# Patient Record
Sex: MALE | Race: WHITE | NOT HISPANIC OR LATINO
[De-identification: names, ages, dates, MRNs, and addresses within clinical notes are randomized per-mention and may not be internally consistent; named-entity substitution may affect disease eponyms.]

---

## 2022-05-31 PROBLEM — Z00.00 ENCOUNTER FOR PREVENTIVE HEALTH EXAMINATION: Status: ACTIVE | Noted: 2022-05-31

## 2022-06-14 ENCOUNTER — APPOINTMENT (OUTPATIENT)
Dept: ORTHOPEDIC SURGERY | Facility: CLINIC | Age: 72
End: 2022-06-14
Payer: OTHER MISCELLANEOUS

## 2022-06-14 PROCEDURE — 99072 ADDL SUPL MATRL&STAF TM PHE: CPT

## 2022-06-14 PROCEDURE — 99213 OFFICE O/P EST LOW 20 MIN: CPT

## 2022-06-14 NOTE — PHYSICAL EXAM
[Left] : left knee [NL (0)] : extension 0 degrees [5___] : quadriceps 5[unfilled]/5 [] : patient ambulates with assistive device [de-identified] : sl quad atrophy [TWNoteComboBox7] : flexion 135 degrees

## 2022-06-14 NOTE — HISTORY OF PRESENT ILLNESS
[Work related] : work related [Dull/Aching] : dull/aching [Localized] : localized [Household chores] : household chores [Leisure] : leisure [Social interactions] : social interactions [Rest] : rest [Sitting] : sitting [Standing] : standing [Walking] : walking [Stairs] : stairs [7] : 7 [4] : 4 [Intermittent] : intermittent [de-identified] : WC 6/29/96 \par 3/1/11: Pt presents for cont. pain in L knee.\par 9/7/11: worsening of pain in L knee; requesting injection in L knee due to increase in pain.\par 12/14/11: f/u L knee pain, notes increasing pain. Pt would like to start visco-supplementation injections again however\par he lives in [place] and hasn't found a doctor to do it yet.\par 3/21/12: f/u L knee, notes increased sensitivity to touch, otherwise doing well.\par 10/24/12 f/u L knee, takes Norco\par 2/20/13: f/u L knee, cont with Celebrex and Norco. Notes pain on L inner thigh. Requesting refill on Tramadol and Norco.\par Pt doesn't wish to do Supartz injections at this time.\par 4/9/14 f/u L knee, considering tka. Requesting injection\par 8/20/14 f/u L knee\par 12/10/14: f/u L knee. stable.\par 6/17/15 f/u L knee, feels weakness\par 10/7/15 f/u L knee tightness on occasion\par 12/16/15: f/u L knee. stable. taking meds.\par 4/6/16 f/u L knee, better w/ Celebrex Had locking episode\par 8/25/16: f/u L knee, Celebrex very helpful, had to stop because of prostate markers and had exacerbation of L knee\par pain.\par 12/15/16 f/u L knee. Just complete Ca treatment/radiation\par 4/18/17 f/u L knee activity related pain. psa .4 Recent tick bite/doxycycline\par 10/19/17 f/u L knee, symptoms aggravated recently, managing with hep and meds, psa .14\par 4/5/18 f/u L knee bicycle occas locking Celebrex/Ultram/Vicodin Retired\par 8/30/18 f/u L knee "Rice crispy" feelings. Celebrex r/n Retired\par 3/28/19: f/u L knee, continued lateral knee pain. Celebrex and hydrocodone prn\par 9/19/19: f/u L knee, continued pain that is variable, pain medial and lateral.\par 3/12/20 f/u L knee walks 5-6 miles/d Pain persists\par 10/29/20 Televisit L knee med request\par 4/6/21 Televisit L knee stable Med r/n\par 9/28/21: f/u L knee, continued cracking/popping of the knee. requesting renewal on medication.\par 6/14/22: f/u L knee; increasing pain, no swelling. Walks about 5 mils/day.  [] : Post Surgical Visit: no [FreeTextEntry1] : L knee [FreeTextEntry3] :  6/29/96

## 2022-06-14 NOTE — WORK
[Cannot return to work because ________] : cannot return to work because [unfilled] [Rx may affect patient's ability to return to work, make patient drowsy, or other issue] : Rx may affect patient's ability to return to work, make patient drowsy, or other issue. [I provided the services listed above] :  I provided the services listed above.

## 2022-07-17 ENCOUNTER — FORM ENCOUNTER (OUTPATIENT)
Age: 72
End: 2022-07-17

## 2023-04-21 ENCOUNTER — APPOINTMENT (OUTPATIENT)
Dept: ORTHOPEDIC SURGERY | Facility: CLINIC | Age: 73
End: 2023-04-21
Payer: OTHER MISCELLANEOUS

## 2023-04-21 VITALS — HEIGHT: 69.5 IN | BODY MASS INDEX: 26.93 KG/M2 | WEIGHT: 186 LBS

## 2023-04-21 PROCEDURE — 99213 OFFICE O/P EST LOW 20 MIN: CPT | Mod: 25

## 2023-04-21 PROCEDURE — 73562 X-RAY EXAM OF KNEE 3: CPT | Mod: LT

## 2023-04-21 PROCEDURE — 20611 DRAIN/INJ JOINT/BURSA W/US: CPT | Mod: LT

## 2023-04-21 PROCEDURE — J3490M: CUSTOM

## 2023-04-21 NOTE — HISTORY OF PRESENT ILLNESS
[Work related] : work related [8] : 8 [4] : 4 [Dull/Aching] : dull/aching [Localized] : localized [Intermittent] : intermittent [Household chores] : household chores [Leisure] : leisure [Social interactions] : social interactions [Rest] : rest [Sitting] : sitting [Standing] : standing [Walking] : walking [Stairs] : stairs [de-identified] : WC 6/29/96 \par 3/1/11: Pt presents for cont. pain in L knee.\par 9/7/11: worsening of pain in L knee; requesting injection in L knee due to increase in pain.\par 12/14/11: f/u L knee pain, notes increasing pain. Pt would like to start visco-supplementation injections again however\par he lives in [place] and hasn't found a doctor to do it yet.\par 3/21/12: f/u L knee, notes increased sensitivity to touch, otherwise doing well.\par 10/24/12 f/u L knee, takes Norco\par 2/20/13: f/u L knee, cont with Celebrex and Norco. Notes pain on L inner thigh. Requesting refill on Tramadol and Norco.\par Pt doesn't wish to do Supartz injections at this time.\par 4/9/14 f/u L knee, considering tka. Requesting injection\par 8/20/14 f/u L knee\par 12/10/14: f/u L knee. stable.\par 6/17/15 f/u L knee, feels weakness\par 10/7/15 f/u L knee tightness on occasion\par 12/16/15: f/u L knee. stable. taking meds.\par 4/6/16 f/u L knee, better w/ Celebrex Had locking episode\par 8/25/16: f/u L knee, Celebrex very helpful, had to stop because of prostate markers and had exacerbation of L knee\par pain.\par 12/15/16 f/u L knee. Just complete Ca treatment/radiation\par 4/18/17 f/u L knee activity related pain. psa .4 Recent tick bite/doxycycline\par 10/19/17 f/u L knee, symptoms aggravated recently, managing with hep and meds, psa .14\par 4/5/18 f/u L knee bicycle occas locking Celebrex/Ultram/Vicodin Retired\par 8/30/18 f/u L knee "Rice crispy" feelings. Celebrex r/n Retired\par 3/28/19: f/u L knee, continued lateral knee pain. Celebrex and hydrocodone prn\par 9/19/19: f/u L knee, continued pain that is variable, pain medial and lateral.\par 3/12/20 f/u L knee walks 5-6 miles/d Pain persists\par 10/29/20 Televisit L knee med request\par 4/6/21 Televisit L knee stable Med r/n\par 9/28/21: f/u L knee, continued cracking/popping of the knee. requesting renewal on medication.\par 6/14/22: f/u L knee; increasing pain, no swelling. Walks about 5 mils/day. \par 4/21/23: f/u L knee; increasing pain to the left knee climbing a ladder; pain laterally. aleve as needed  Requesting injection [] : Post Surgical Visit: no [FreeTextEntry1] : L knee [FreeTextEntry3] :  6/29/96

## 2023-04-21 NOTE — PHYSICAL EXAM
[Left] : left knee [NL (0)] : extension 0 degrees [5___] : quadriceps 5[unfilled]/5 [] : patient ambulates with assistive device [de-identified] : sl quad atrophy [TWNoteComboBox7] : flexion 135 degrees

## 2023-04-21 NOTE — IMAGING
[Lateral] : lateral [[place]] : [place] [AP Standing] : anteroposterior standing [Degenerative change] : Degenerative change [FreeTextEntry9] : meniscal calcifications

## 2023-04-21 NOTE — PROCEDURE
[Large Joint Injection] : Large joint injection [Knee] : knee [Alcohol] : alcohol [Ethyl Chloride sprayed topically] : ethyl chloride sprayed topically [Sterile technique used] : sterile technique used [___ cc    6mg] :  Betamethasone (Celestone) ~Vcc of 6mg [___ cc    1%] : Lidocaine ~Vcc of 1%  [___ cc    0.25%] : Bupivacaine (Marcaine) ~Vcc of 0.25%

## 2024-03-05 ENCOUNTER — APPOINTMENT (OUTPATIENT)
Dept: ORTHOPEDIC SURGERY | Facility: CLINIC | Age: 74
End: 2024-03-05
Payer: OTHER MISCELLANEOUS

## 2024-03-05 DIAGNOSIS — M12.562 TRAUMATIC ARTHROPATHY, LEFT KNEE: ICD-10-CM

## 2024-03-05 PROCEDURE — 20611 DRAIN/INJ JOINT/BURSA W/US: CPT | Mod: LT

## 2024-03-05 PROCEDURE — J3490M: CUSTOM

## 2024-03-05 PROCEDURE — 99214 OFFICE O/P EST MOD 30 MIN: CPT | Mod: 25

## 2024-03-05 RX ORDER — CELECOXIB 200 MG/1
200 CAPSULE ORAL DAILY
Qty: 120 | Refills: 2 | Status: ACTIVE | COMMUNITY
Start: 2022-06-14 | End: 1900-01-01

## 2024-03-05 RX ORDER — HYDROCODONE BITARTRATE AND ACETAMINOPHEN 10; 325 MG/1; MG/1
10-325 TABLET ORAL
Qty: 90 | Refills: 0 | Status: ACTIVE | COMMUNITY
Start: 2022-07-19 | End: 1900-01-01

## 2024-03-05 NOTE — HISTORY OF PRESENT ILLNESS
[Work related] : work related [8] : 8 [4] : 4 [Dull/Aching] : dull/aching [Localized] : localized [Intermittent] : intermittent [Household chores] : household chores [Leisure] : leisure [Social interactions] : social interactions [Rest] : rest [Sitting] : sitting [Standing] : standing [Walking] : walking [Stairs] : stairs [de-identified] : WC 6/29/96  3/1/11: Pt presents for cont. pain in L knee. 9/7/11: worsening of pain in L knee; requesting injection in L knee due to increase in pain. 12/14/11: f/u L knee pain, notes increasing pain. Pt would like to start visco-supplementation injections again however he lives in [place] and hasn't found a doctor to do it yet. 3/21/12: f/u L knee, notes increased sensitivity to touch, otherwise doing well. 10/24/12 f/u L knee, takes Norco 2/20/13: f/u L knee, cont with Celebrex and Norco. Notes pain on L inner thigh. Requesting refill on Tramadol and Norco. Pt doesn't wish to do Supartz injections at this time. 4/9/14 f/u L knee, considering tka. Requesting injection 8/20/14 f/u L knee 12/10/14: f/u L knee. stable. 6/17/15 f/u L knee, feels weakness 10/7/15 f/u L knee tightness on occasion 12/16/15: f/u L knee. stable. taking meds. 4/6/16 f/u L knee, better w/ Celebrex Had locking episode 8/25/16: f/u L knee, Celebrex very helpful, had to stop because of prostate markers and had exacerbation of L knee pain. 12/15/16 f/u L knee. Just complete Ca treatment/radiation 4/18/17 f/u L knee activity related pain. psa .4 Recent tick bite/doxycycline 10/19/17 f/u L knee, symptoms aggravated recently, managing with hep and meds, psa .14 4/5/18 f/u L knee bicycle occas locking Celebrex/Ultram/Vicodin Retired 8/30/18 f/u L knee "Rice crispy" feelings. Celebrex r/n Retired 3/28/19: f/u L knee, continued lateral knee pain. Celebrex and hydrocodone prn 9/19/19: f/u L knee, continued pain that is variable, pain medial and lateral. 3/12/20 f/u L knee walks 5-6 miles/d Pain persists 10/29/20 Televisit L knee med request 4/6/21 Televisit L knee stable Med r/n 9/28/21: f/u L knee, continued cracking/popping of the knee. requesting renewal on medication. 6/14/22: f/u L knee; increasing pain, no swelling. Walks about 5 mils/day.  4/21/23: f/u L knee; increasing pain to the left knee climbing a ladder; pain laterally. aleve as needed  Requesting injection 03/05/2024: follow up left knee. Continued pain. Requesting injection.  [] : Post Surgical Visit: no [FreeTextEntry1] : L knee [FreeTextEntry3] :  6/29/96

## 2024-03-05 NOTE — PROCEDURE
[Large Joint Injection] : Large joint injection [Knee] : knee [Alcohol] : alcohol [Ethyl Chloride sprayed topically] : ethyl chloride sprayed topically [Sterile technique used] : sterile technique used [___ cc    6mg] :  Betamethasone (Celestone) ~Vcc of 6mg [___ cc    1%] : Lidocaine ~Vcc of 1%  [___ cc    0.25%] : Bupivacaine (Marcaine) ~Vcc of 0.25%  [] : Patient tolerated procedure well [Call if redness, pain or fever occur] : call if redness, pain or fever occur [Apply ice for 15min out of every hour for the next 12-24 hours as tolerated] : apply ice for 15 minutes out of every hour for the next 12-24 hours as tolerated [Previous OTC use and PT nontherapeutic] : patient has tried OTC's including aspirin, Ibuprofen, Aleve, etc or prescription NSAIDS, and/or exercises at home and/or physical therapy without satisfactory response [Risks, benefits, alternatives discussed / Verbal consent obtained] : the risks benefits, and alternatives have been discussed, and verbal consent was obtained [Patient had decreased mobility in the joint] : patient had decreased mobility in the joint [Prior failure or difficult injection] : prior failure or difficult injection [All ultrasound images have been permanently captured and stored accordingly in our picture archiving and communication system] : All ultrasound images have been permanently captured and stored accordingly in our picture archiving and communication system [Visualization of the needle and placement of injection was performed without complication] : visualization of the needle and placement of injection was performed without complication

## 2024-03-05 NOTE — PHYSICAL EXAM
[Left] : left knee [NL (0)] : extension 0 degrees [5___] : quadriceps 5[unfilled]/5 [] : patient ambulates with assistive device [de-identified] : sl quad atrophy [TWNoteComboBox7] : flexion 135 degrees

## 2024-08-30 ENCOUNTER — APPOINTMENT (OUTPATIENT)
Dept: ORTHOPEDIC SURGERY | Facility: CLINIC | Age: 74
End: 2024-08-30
Payer: OTHER MISCELLANEOUS

## 2024-08-30 DIAGNOSIS — M12.562 TRAUMATIC ARTHROPATHY, LEFT KNEE: ICD-10-CM

## 2024-08-30 PROCEDURE — 99213 OFFICE O/P EST LOW 20 MIN: CPT | Mod: 25

## 2024-08-30 PROCEDURE — 20610 DRAIN/INJ JOINT/BURSA W/O US: CPT | Mod: LT

## 2024-08-30 PROCEDURE — J3490M: CUSTOM

## 2024-08-30 NOTE — PROCEDURE
[Large Joint Injection] : Large joint injection [Knee] : knee [Alcohol] : alcohol [Ethyl Chloride sprayed topically] : ethyl chloride sprayed topically [Sterile technique used] : sterile technique used [___ cc    6mg] :  Betamethasone (Celestone) ~Vcc of 6mg [___ cc    1%] : Lidocaine ~Vcc of 1%  [___ cc    0.25%] : Bupivacaine (Marcaine) ~Vcc of 0.25%  [] : Patient tolerated procedure well [Call if redness, pain or fever occur] : call if redness, pain or fever occur [Apply ice for 15min out of every hour for the next 12-24 hours as tolerated] : apply ice for 15 minutes out of every hour for the next 12-24 hours as tolerated [Previous OTC use and PT nontherapeutic] : patient has tried OTC's including aspirin, Ibuprofen, Aleve, etc or prescription NSAIDS, and/or exercises at home and/or physical therapy without satisfactory response [Patient had decreased mobility in the joint] : patient had decreased mobility in the joint [Risks, benefits, alternatives discussed / Verbal consent obtained] : the risks benefits, and alternatives have been discussed, and verbal consent was obtained

## 2024-08-30 NOTE — HISTORY OF PRESENT ILLNESS
[Work related] : work related [8] : 8 [4] : 4 [Dull/Aching] : dull/aching [Localized] : localized [Intermittent] : intermittent [Household chores] : household chores [Leisure] : leisure [Social interactions] : social interactions [Rest] : rest [Sitting] : sitting [Standing] : standing [Walking] : walking [Stairs] : stairs [de-identified] : WC 6/29/96  3/1/11: Pt presents for cont. pain in L knee. 9/7/11: worsening of pain in L knee; requesting injection in L knee due to increase in pain. 12/14/11: f/u L knee pain, notes increasing pain. Pt would like to start visco-supplementation injections again however he lives in [place] and hasn't found a doctor to do it yet. 3/21/12: f/u L knee, notes increased sensitivity to touch, otherwise doing well. 10/24/12 f/u L knee, takes Norco 2/20/13: f/u L knee, cont with Celebrex and Norco. Notes pain on L inner thigh. Requesting refill on Tramadol and Norco. Pt doesn't wish to do Supartz injections at this time. 4/9/14 f/u L knee, considering tka. Requesting injection 8/20/14 f/u L knee 12/10/14: f/u L knee. stable. 6/17/15 f/u L knee, feels weakness 10/7/15 f/u L knee tightness on occasion 12/16/15: f/u L knee. stable. taking meds. 4/6/16 f/u L knee, better w/ Celebrex Had locking episode 8/25/16: f/u L knee, Celebrex very helpful, had to stop because of prostate markers and had exacerbation of L knee pain. 12/15/16 f/u L knee. Just complete Ca treatment/radiation 4/18/17 f/u L knee activity related pain. psa .4 Recent tick bite/doxycycline 10/19/17 f/u L knee, symptoms aggravated recently, managing with hep and meds, psa .14 4/5/18 f/u L knee bicycle occas locking Celebrex/Ultram/Vicodin Retired 8/30/18 f/u L knee "Rice crispy" feelings. Celebrex r/n Retired 3/28/19: f/u L knee, continued lateral knee pain. Celebrex and hydrocodone prn 9/19/19: f/u L knee, continued pain that is variable, pain medial and lateral. 3/12/20 f/u L knee walks 5-6 miles/d Pain persists 10/29/20 Televisit L knee med request 4/6/21 Televisit L knee stable Med r/n 9/28/21: f/u L knee, continued cracking/popping of the knee. requesting renewal on medication. 6/14/22: f/u L knee; increasing pain, no swelling. Walks about 5 mils/day.  4/21/23: f/u L knee; increasing pain to the left knee climbing a ladder; pain laterally. aleve as needed  Requesting injection 03/05/2024: follow up left knee. Continued pain. Requesting injection.  8/30/24- f/u L knee. pain is better from inj. retired  Requeting injectn/mds [] : Post Surgical Visit: no [FreeTextEntry1] : L knee [FreeTextEntry3] :  6/29/96

## 2024-08-30 NOTE — PHYSICAL EXAM
[Left] : left knee [NL (0)] : extension 0 degrees [5___] : quadriceps 5[unfilled]/5 [] : patient ambulates with assistive device [de-identified] : sl quad atrophy [TWNoteComboBox7] : flexion 135 degrees

## 2024-09-18 ENCOUNTER — NON-APPOINTMENT (OUTPATIENT)
Age: 74
End: 2024-09-18

## 2025-09-04 ENCOUNTER — APPOINTMENT (OUTPATIENT)
Dept: ORTHOPEDIC SURGERY | Facility: CLINIC | Age: 75
End: 2025-09-04